# Patient Record
(demographics unavailable — no encounter records)

---

## 2024-11-01 NOTE — ADDENDUM
[FreeTextEntry1] : I, Adan Kvng, acted solely as a scribe for Dr. Charlie Elizabeth on this date 11/01/2024.   All medical record entries made by the Scribe were at my, Dr. Charlie Elizabeth, direction and personally dictated by me on 11/01/2024. I have reviewed the chart and agree that the record accurately reflects my personal performance of the history, physical exam, assessment and plan. I have also personally directed, reviewed, and agreed with the chart.

## 2024-11-01 NOTE — DISCUSSION/SUMMARY
[de-identified] :  Today I had a lengthy discussion with the patient regarding their right foot, hallux rigidus. I have addressed all the patient's concerns surrounding the pathology of their condition. Overall, patient is doing well. The patient will continue with conservative treatments, but I did explain to the patient of the risks of consecutive cortisone injections. The patient can resume normal baseline activities as tolerated.  Plan: 1. A discussion was had about a possible ultrasound-guided cortisone injection for the right 1st MTP. The patient would like to move forward with the procedure. A new prescription was provided in the office today. 2. I recommend that the patient utilize ice, NSAIDS PRN, and heat.  3. A discussion was had about shoe-wear modifications. I advised the patient to utilize a wide toed cross training sneaker that better accommodates the feet. I recommended New Balance, Beckman, Saucony, or Hoka to the patient. 4.  I recommend that the patient utilize Voltaren gel topically. If the Voltaren gel could not be obtained, Icy Hot, Biofreeze, or Bengay can be utilized instead. 5. I do want the patient to follow up with her PCP for stiffness in her 1st MTP joint.  f/u in 3-4 months prn  The patient understood and verbally agreed to the treatment plan. All of their questions were answered and they were satisfied with the visit. The patient should call the office if they have any questions or experience worsening symptoms.

## 2024-11-01 NOTE — PHYSICAL EXAM
[de-identified] : Right foot Physical Examination:  General: Alert and oriented x3.  In no acute distress.  Pleasant in nature with a normal affect.  No apparent respiratory distress.  Erythema, Warmth, Rubor: Negative Swelling: Negative  ROM Ankle: 1. Dorsiflexion: 10 degrees 2. Plantarflexion: 40 degrees 3. Inversion: 30 degrees 4. Eversion: 20 degrees  ROM of digits: Normal  Pes Planus: Negative Pes Cavus: Negative  Bunion: Negative Tailor's Bunion (Bunionette): Negative Hammer Toe Deformity/Deformities: Negative  Tenderness to Palpation:  1. Heel Pain: Negative 2. Midfoot Pain: Negative 3. First MTP Joint: Positive 4. Lis Franc Joint: Negative  Tenderness Metatarsals: 1st MT: Positive, improved 2nd MT: Negative 3rd MT: Negative 4th MT: Negative 5th MT: Negative Base of the 5th MT: Negative  Ligament Pain: 1. Lis Franc Ligament: Negative 2. Plantar Fascia Ligament: Negative  Strength:  5/5 TA/GS/EHL/FHL/EDL/ADD/ABD  Pulses: 2+ DP/PT Pulses  Capillary Refill Toes: <2 seconds  Neuro: Intact motor and sensory throughout  Additional Test: 1. Beasley's Squeeze Test: Negative 2. Calcaneal Squeeze Test: Negative  Right Big Toe Exam ROM Great toe: 15 degrees of dorsiflexion, 10 degrees of plantarflexion. [de-identified] : EXAM: [id number] - MR FOOT RT  - ORDERED BY:  LIZZY STAPLETON   PROCEDURE DATE:  01/16/2024    INTERPRETATION:  MR FOOT RIGHT  CLINICAL INFORMATION: MRI right foot; eval big toe for arthritis and loose body; TECHNIQUE: MR FOOT RIGHT COMPARISON: None available. FINDINGS:  BONE MARROW: No fracture or osteonecrosis. JOINTS: Arthrosis of the first metatarsal phalangeal joint with focal full-thickness chondral defects with subchondral cystic change and bulky dorsal osteophyte. Mild cystic change in the hallux sesamoids. SYNOVIUM/ JOINT FLUID: Small first metatarsophalangeal joint effusion. No obvious joint body, limited by technique. TENDONS: No tendon tear. LIGAMENTS: No ligament tear. MUSCLES: No muscle edema or atrophy. NEUROVASCULAR STRUCTURES: Normal. SUBCUTANEOUS SOFT TISSUES: Mild infiltration of the second webspace. No neuroma. Mild second intermetatarsal bursitis.  IMPRESSION:  At least moderate first metatarsophalangeal joint arthrosis with findings which may be seen with hallux rigidus.  --- End of Report ---       KANA LEWIS MD; Attending Radiologist This document has been electronically signed. Jan 21 2024  9:39AM

## 2025-03-11 NOTE — HISTORY OF PRESENT ILLNESS
Post-Op Assessment Note    CV Status:  Stable  Pain Score: 0    Pain management: adequate       Mental Status:  Alert and awake   Hydration Status:  Euvolemic   PONV Controlled:  Controlled   Airway Patency:  Patent     Post Op Vitals Reviewed: Yes    No anethesia notable event occurred.    Staff: Anesthesiologist           Last Filed PACU Vitals:  Vitals Value Taken Time   Temp 98.5 °F (36.9 °C) 03/11/25 1304   Pulse 75 03/11/25 1330   /72 03/11/25 1330   Resp 12 03/11/25 1330   SpO2 99 % 03/11/25 1330       Modified Laura:     Vitals Value Taken Time   Activity 2 03/11/25 1330   Respiration 2 03/11/25 1330   Circulation 2 03/11/25 1330   Consciousness 2 03/11/25 1330   Oxygen Saturation 2 03/11/25 1330     Modified Laura Score: 10             [FreeTextEntry1] : 11/01/2024: JAMES NEW is a 54 year old female presenting to the office for a follow up evaluation of right foot, hallux rigidus. She is 4 months s/p cortisone injection. She states that the cortisone injection did alleviate her symptoms. However, she believes that the effects of the injection have worn off. She now endorses stiffness and discomfort but denies pain. She details intermittent swelling. She is here for another prescription for cortisone. The patient presents to the office in flip flops and ambulating without assistance.  06/17/2024: JAMES NEW is a 53 year old female presenting to the office for a follow up evaluation of right foot, hallux rigidus. The patient reports no change in her symptoms since her last visit.  She endorses pain in the plantar and medial aspect of her foot, and feels as if she is compensating when walking for prolonged periods of time. She is here to discuss surgery (cheilectomy) and cortisone injections. The patient presents to the office in flip flops and ambulating without assistance.  1/31/2024: The patient is a 53-year-old female who presents for a follow-up visit right foot great toe pain and to review the MRI of her right foot.  No change in symptoms since her previous office visit. She received a cortisone injection that also did not improve her pain levels. She notes that she would like to move forward with Surgery in August as she is traveling in July in [facility] for three weeks.  The patient presents to the office in sneakers and ambulating without assistance.  1/10/2024: The patient is a 53-year-old female who presents with right foot great toe pain. The patient states that she has been experiencing symptoms for the past couple of years. She has been seeing Dr. Tu Bullock who has been treating her conservatively. Her last cortisone injection was August 2023 where she felt minimal relief. She states that she has received a total of 3 cortisone injections in the area. She is looking for surgical intervention. She presents today wearing sneakers and is ambulating without assistance. No other complaints.

## 2025-04-14 NOTE — ADDENDUM
[FreeTextEntry1] : I, Adan Kvng, acted solely as a scribe for Dr. Charlie Elizabeth on this date 04/14/2025.   All medical record entries made by the Scribe were at my, Dr. Charlie Elizabeth, direction and personally dictated by me on 04/14/2025. I have reviewed the chart and agree that the record accurately reflects my personal performance of the history, physical exam, assessment and plan. I have also personally directed, reviewed, and agreed with the chart.

## 2025-04-14 NOTE — PHYSICAL EXAM
[de-identified] : Right foot Physical Examination:  General: Alert and oriented x3.  In no acute distress.  Pleasant in nature with a normal affect.  No apparent respiratory distress.  Erythema, Warmth, Rubor: Negative Swelling: Negative  ROM Ankle: 1. Dorsiflexion: 10 degrees 2. Plantarflexion: 40 degrees 3. Inversion: 30 degrees 4. Eversion: 20 degrees  ROM of digits: Normal  Pes Planus: Negative Pes Cavus: Negative  Bunion: Negative Tailor's Bunion (Bunionette): Negative Hammer Toe Deformity/Deformities: Negative  Tenderness to Palpation:  1. Heel Pain: Negative 2. Midfoot Pain: Negative 3. First MTP Joint: Positive 4. Lis Franc Joint: Negative  Tenderness Metatarsals: 1st MT: Positive, improved 2nd MT: Negative 3rd MT: Negative 4th MT: Negative 5th MT: Negative Base of the 5th MT: Negative  Ligament Pain: 1. Lis Franc Ligament: Negative 2. Plantar Fascia Ligament: Negative  Strength:  5/5 TA/GS/EHL/FHL/EDL/ADD/ABD  Pulses: 2+ DP/PT Pulses  Capillary Refill Toes: <2 seconds  Neuro: Intact motor and sensory throughout  Additional Test: 1. Beasley's Squeeze Test: Negative 2. Calcaneal Squeeze Test: Negative  Right Big Toe Exam ROM Great toe: 15 degrees of dorsiflexion, 10 degrees of plantarflexion. [de-identified] : Radiology imaging reviewed in office with the patient on 04/14/2025 and I agree with the radiologist's impression below.  EXAM: [id number] - MR FOOT RT  - ORDERED BY:  LIZZY STAPLETON   PROCEDURE DATE:  01/16/2024    INTERPRETATION:  MR FOOT RIGHT  CLINICAL INFORMATION: MRI right foot; eval big toe for arthritis and loose body; TECHNIQUE: MR FOOT RIGHT COMPARISON: None available. FINDINGS:  BONE MARROW: No fracture or osteonecrosis. JOINTS: Arthrosis of the first metatarsal phalangeal joint with focal full-thickness chondral defects with subchondral cystic change and bulky dorsal osteophyte. Mild cystic change in the hallux sesamoids. SYNOVIUM/ JOINT FLUID: Small first metatarsophalangeal joint effusion. No obvious joint body, limited by technique. TENDONS: No tendon tear. LIGAMENTS: No ligament tear. MUSCLES: No muscle edema or atrophy. NEUROVASCULAR STRUCTURES: Normal. SUBCUTANEOUS SOFT TISSUES: Mild infiltration of the second webspace. No neuroma. Mild second intermetatarsal bursitis.  IMPRESSION:  At least moderate first metatarsophalangeal joint arthrosis with findings which may be seen with hallux rigidus.  --- End of Report ---       KANA LEWIS MD; Attending Radiologist This document has been electronically signed. Jan 21 2024  9:39AM

## 2025-04-14 NOTE — HISTORY OF PRESENT ILLNESS
[FreeTextEntry1] : 04/14/2025: JAMES NEW is a 54 year old female presenting to the office for a follow up evaluation of her right foot, hallux rigidus. She is s/p cortisone injection to her 1st MTP joint. The injection did provide temporary relief. However, her symptoms have recurred. She wishes to proceed with surgery in September. The patient presents to the office in sneakers and ambulating without assistance.  11/01/2024: JAMES NEW is a 54 year old female presenting to the office for a follow up evaluation of right foot, hallux rigidus. She is 4 months s/p cortisone injection. She states that the cortisone injection did alleviate her symptoms. However, she believes that the effects of the injection have worn off. She now endorses stiffness and discomfort but denies pain. She details intermittent swelling. She is here for another prescription for cortisone. The patient presents to the office in flip flops and ambulating without assistance.  06/17/2024: JAMES NEW is a 53 year old female presenting to the office for a follow up evaluation of right foot, hallux rigidus. The patient reports no change in her symptoms since her last visit.  She endorses pain in the plantar and medial aspect of her foot, and feels as if she is compensating when walking for prolonged periods of time. She is here to discuss surgery (cheilectomy) and cortisone injections. The patient presents to the office in flip flops and ambulating without assistance.  1/31/2024: The patient is a 53-year-old female who presents for a follow-up visit right foot great toe pain and to review the MRI of her right foot.  No change in symptoms since her previous office visit. She received a cortisone injection that also did not improve her pain levels. She notes that she would like to move forward with Surgery in August as she is traveling in July in [facility] for three weeks.  The patient presents to the office in sneakers and ambulating without assistance.  1/10/2024: The patient is a 53-year-old female who presents with right foot great toe pain. The patient states that she has been experiencing symptoms for the past couple of years. She has been seeing Dr. Tu Bullock who has been treating her conservatively. Her last cortisone injection was August 2023 where she felt minimal relief. She states that she has received a total of 3 cortisone injections in the area. She is looking for surgical intervention. She presents today wearing sneakers and is ambulating without assistance. No other complaints.

## 2025-04-14 NOTE — DISCUSSION/SUMMARY
[de-identified] :  Today I had a lengthy discussion with the patient regarding their right foot, hallux rigidus. I have addressed all the patient's concerns surrounding the pathology of their condition. She wishes to proceed with surgery around December. For now, the patient will continue with conservative treatments.   Plan: 1. A discussion was had about a possible ultrasound-guided cortisone injection for the right 1st MTP. The patient would like to move forward with the procedure. A new prescription was provided in the office today. 2. I recommend that the patient utilize ice, NSAIDS PRN, and heat.  3. A discussion was had about shoe-wear modifications. I advised the patient to utilize a wide toed cross training sneaker that better accommodates the feet. I recommended New Balance, Beckman, Saucony, or Hoka to the patient. 4.  I recommend that the patient utilize Voltaren gel topically. If the Voltaren gel could not be obtained, Icy Hot, Biofreeze, or Bengay can be utilized instead. 5. Activity modification was discussed in great detail. Weight bear as tolerated. 6. A lengthy discussion was had about the surgery. All risks, benefits and alternatives to the recommended surgical procedure were discussed which include but are not limited to bleeding, infection, nerve damage, vascular damage, failure of the wound to heal, the need for further surgery, loss of limb, DVT, PE, loss of life as well as the risks associated with general anesthesia. The patient verbalized understanding.  The patient understood and verbally agreed to the treatment plan. All of their questions were answered and they were satisfied with the visit. The patient should call the office if they have any questions or experience worsening symptoms.  ultrasound-guided cortisone injection to her right big toe.  1st MTP joint arthrodesis, possible surgical discussion  f/u in 5 months.